# Patient Record
Sex: FEMALE | URBAN - METROPOLITAN AREA
[De-identification: names, ages, dates, MRNs, and addresses within clinical notes are randomized per-mention and may not be internally consistent; named-entity substitution may affect disease eponyms.]

---

## 2018-05-08 ENCOUNTER — TELEPHONE (OUTPATIENT)
Dept: ENDOCRINOLOGY | Facility: CLINIC | Age: 48
End: 2018-05-08

## 2018-05-08 NOTE — TELEPHONE ENCOUNTER
----- Message from Luiza Machuca sent at 5/8/2018  3:47 PM CDT -----  Rose Mary ly/ Dr. Loy Schulz [facility] office is calling to schedule pt a appt. Caller states that pt is gaining weight and has been feeling fatigue. 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 Its not allowing me to schedule on Dr. Arevalo schedule and was told by the supervisor to send message to get pt schedule. Please call Rose Mary @  [phone] ext [phone]

## 2018-05-08 NOTE — TELEPHONE ENCOUNTER
Rose Mary advised that appointment will be scheduled upon receiving referral. Rose Mary verbalized understanding.

## 2018-05-09 ENCOUNTER — TELEPHONE (OUTPATIENT)
Dept: ENDOCRINOLOGY | Facility: CLINIC | Age: 48
End: 2018-05-09